# Patient Record
Sex: FEMALE
[De-identification: names, ages, dates, MRNs, and addresses within clinical notes are randomized per-mention and may not be internally consistent; named-entity substitution may affect disease eponyms.]

---

## 2019-09-23 ENCOUNTER — MESSAGE (OUTPATIENT)
Age: 49
End: 2019-09-23

## 2019-09-23 ENCOUNTER — APPOINTMENT (OUTPATIENT)
Dept: BREAST CENTER | Facility: CLINIC | Age: 49
End: 2019-09-23

## 2019-09-23 VITALS
HEART RATE: 73 BPM | BODY MASS INDEX: 25.74 KG/M2 | SYSTOLIC BLOOD PRESSURE: 118 MMHG | DIASTOLIC BLOOD PRESSURE: 77 MMHG | WEIGHT: 164 LBS | HEIGHT: 67 IN

## 2019-09-23 DIAGNOSIS — Z86.718 PERSONAL HISTORY OF OTHER VENOUS THROMBOSIS AND EMBOLISM: ICD-10-CM

## 2019-09-23 DIAGNOSIS — N63.20 UNSPECIFIED LUMP IN THE LEFT BREAST, UNSPECIFIED QUADRANT: ICD-10-CM

## 2019-09-23 DIAGNOSIS — Z78.9 OTHER SPECIFIED HEALTH STATUS: ICD-10-CM

## 2019-09-23 DIAGNOSIS — N60.02 SOLITARY CYST OF LEFT BREAST: ICD-10-CM

## 2019-09-23 DIAGNOSIS — Z86.39 PERSONAL HISTORY OF OTHER ENDOCRINE, NUTRITIONAL AND METABOLIC DISEASE: ICD-10-CM

## 2019-09-23 DIAGNOSIS — N61.0 MASTITIS WITHOUT ABSCESS: ICD-10-CM

## 2019-09-23 DIAGNOSIS — R92.8 OTHER ABNORMAL AND INCONCLUSIVE FINDINGS ON DIAGNOSTIC IMAGING OF BREAST: ICD-10-CM

## 2019-09-23 DIAGNOSIS — Z80.41 FAMILY HISTORY OF MALIGNANT NEOPLASM OF OVARY: ICD-10-CM

## 2019-09-23 PROBLEM — Z00.00 ENCOUNTER FOR PREVENTIVE HEALTH EXAMINATION: Status: ACTIVE | Noted: 2019-09-23

## 2019-09-23 RX ORDER — LEVOTHYROXINE SODIUM 137 UG/1
TABLET ORAL
Refills: 0 | Status: ACTIVE | COMMUNITY

## 2019-09-23 NOTE — PHYSICAL EXAM
[Normocephalic] : normocephalic [Atraumatic] : atraumatic [Supple] : supple [No Supraclavicular Adenopathy] : no supraclavicular adenopathy [Examined in the supine and seated position] : examined in the supine and seated position [No dominant masses] : no dominant masses in right breast  [No Nipple Retraction] : no right nipple retraction [No Nipple Discharge] : no right nipple discharge [No Axillary Lymphadenopathy] : no left axillary lymphadenopathy [No Rashes] : no rashes [No Edema] : no edema [No Ulceration] : no ulceration

## 2019-09-24 RX ORDER — CEPHALEXIN 500 MG/1
500 CAPSULE ORAL 3 TIMES DAILY
Qty: 21 | Refills: 0 | Status: ACTIVE | COMMUNITY
Start: 2019-09-24 | End: 1900-01-01

## 2019-09-24 RX ORDER — SULFAMETHOXAZOLE AND TRIMETHOPRIM 800; 160 MG/1; MG/1
800-160 TABLET ORAL TWICE DAILY
Qty: 14 | Refills: 0 | Status: DISCONTINUED | COMMUNITY
Start: 2019-09-23 | End: 2019-09-24

## 2019-09-25 ENCOUNTER — RESULT REVIEW (OUTPATIENT)
Age: 49
End: 2019-09-25

## 2019-09-30 ENCOUNTER — APPOINTMENT (OUTPATIENT)
Dept: ULTRASOUND IMAGING | Facility: HOSPITAL | Age: 49
End: 2019-09-30

## 2019-09-30 ENCOUNTER — APPOINTMENT (OUTPATIENT)
Dept: MAMMOGRAPHY | Facility: HOSPITAL | Age: 49
End: 2019-09-30

## 2019-10-03 NOTE — HISTORY OF PRESENT ILLNESS
[FreeTextEntry1] : 48 year old pre-menopausal [employer] female presents for consultation regarding new palpable abnormality with associated tenderness in left breast LIQ first noted 1 week ago. She reports onset of yellow discharge noted left inverted nipple also one week ago. Patient denies fevers but states that her breast has been red in that area and somewhat warm. DEANN lifetime risk of 10.1% \par \par On 3/6/19 she underwent her screening mammogram/US showing 6.6 x 5.9 x 4.5mm benign appearing nodule/complicated cyst. No suspicious mammographic or sonographic findings and no significant interval change. Small new complicated cyst in left upper breast BIRADS 3 recc left breast US to insure stability. \par \par To note she has a previous h/o DVT in 2014. She is not currently on anticoagulation therapy. \par \par \par \par \par

## 2019-10-03 NOTE — REASON FOR VISIT
[Consultation] : a consultation visit [FreeTextEntry1] : new left breast mass, hx of bilateral breast cysts

## 2019-10-03 NOTE — DATA REVIEWED
[FreeTextEntry1] : 2/14/18 screening b/l mammogram/US showing scattered fibroglandular tissue, area of asymmetry and mild distrtion in left upper breast may represent parenchymal influence. 5.9mm cyst in left upper breast adjacent to fatty lobule, minimally enlarged since previous study, recc dx views left breast BIRADS 0\par 2/20/18 left dx mammo showing no suspicious mammographic findings in left upper breast, previously seen asymmetry likely represents parenchymal or vascular confluence\par 3/6/19 screening b/l mammogram/US showing 6.6 x 5.9 x 4.5mm benign appearing nodule/complicated cyst. No suspicious mammographic or sonographic findings and no significant interval change. Small new complicated cyst in left upper breast BIRADS 3 recc left breast US to insure stability.

## 2019-10-03 NOTE — PAST MEDICAL HISTORY
[Menstruating] : The patient is menstruating [Menarche Age ____] : age at menarche was [unfilled] [Definite ___ (Date)] : the last menstrual period was [unfilled] [Total Preg ___] : G[unfilled] [Live Births ___] : P[unfilled]  [Living ___] : Living: [unfilled] [Age At Live Birth ___] : Age at live birth: [unfilled] [FreeTextEntry5] : ovarian cystectomy \par  x 3 [FreeTextEntry6] : NA [FreeTextEntry7] : NA [FreeTextEntry8] : 1st pregnancy x 3 months, 2nd pregnancy x 1 year, 3rd pregnancy x 2 years

## 2019-10-13 ENCOUNTER — TRANSCRIPTION ENCOUNTER (OUTPATIENT)
Age: 49
End: 2019-10-13

## 2019-10-21 ENCOUNTER — APPOINTMENT (OUTPATIENT)
Dept: MAMMOGRAPHY | Facility: HOSPITAL | Age: 49
End: 2019-10-21
Payer: COMMERCIAL

## 2019-10-21 ENCOUNTER — OUTPATIENT (OUTPATIENT)
Dept: OUTPATIENT SERVICES | Facility: HOSPITAL | Age: 49
LOS: 1 days | End: 2019-10-21
Payer: COMMERCIAL

## 2019-10-21 PROCEDURE — 76641 ULTRASOUND BREAST COMPLETE: CPT

## 2019-10-21 PROCEDURE — 77065 DX MAMMO INCL CAD UNI: CPT | Mod: 26,LT

## 2019-10-21 PROCEDURE — 76641 ULTRASOUND BREAST COMPLETE: CPT | Mod: 26,LT

## 2019-10-21 PROCEDURE — G0279: CPT | Mod: 26

## 2019-10-21 PROCEDURE — G0279: CPT

## 2019-10-21 PROCEDURE — 77065 DX MAMMO INCL CAD UNI: CPT
